# Patient Record
Sex: FEMALE
[De-identification: names, ages, dates, MRNs, and addresses within clinical notes are randomized per-mention and may not be internally consistent; named-entity substitution may affect disease eponyms.]

---

## 2020-07-08 PROBLEM — Z00.00 ENCOUNTER FOR PREVENTIVE HEALTH EXAMINATION: Status: ACTIVE | Noted: 2020-07-08

## 2020-07-20 ENCOUNTER — TRANSCRIPTION ENCOUNTER (OUTPATIENT)
Age: 40
End: 2020-07-20

## 2020-07-20 ENCOUNTER — APPOINTMENT (OUTPATIENT)
Dept: PHYSICAL MEDICINE AND REHAB | Facility: CLINIC | Age: 40
End: 2020-07-20
Payer: COMMERCIAL

## 2020-07-20 VITALS — BODY MASS INDEX: 19.32 KG/M2 | HEIGHT: 62 IN | WEIGHT: 105 LBS | RESPIRATION RATE: 18 BRPM

## 2020-07-20 DIAGNOSIS — Z78.9 OTHER SPECIFIED HEALTH STATUS: ICD-10-CM

## 2020-07-20 DIAGNOSIS — Q79.60 EHLERS-DANLOS SYNDROME, UNSP: ICD-10-CM

## 2020-07-20 DIAGNOSIS — M24.9 JOINT DERANGEMENT, UNSPECIFIED: ICD-10-CM

## 2020-07-20 DIAGNOSIS — M79.10 MYALGIA, UNSPECIFIED SITE: ICD-10-CM
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
## 2020-07-20 DIAGNOSIS — Z56.0 UNEMPLOYMENT, UNSPECIFIED: ICD-10-CM

## 2020-07-20 DIAGNOSIS — M77.11 LATERAL EPICONDYLITIS, RIGHT ELBOW: ICD-10-CM

## 2020-07-20 PROCEDURE — 99204 OFFICE O/P NEW MOD 45 MIN: CPT | Mod: 95

## 2020-07-20 SDOH — ECONOMIC STABILITY - INCOME SECURITY: UNEMPLOYMENT, UNSPECIFIED: Z56.0

## 2020-07-20 NOTE — REVIEW OF SYSTEMS
[Patient Intake Form Reviewed] : Patient intake form was reviewed [Joint Pain] : joint pain [Joint Stiffness] : joint stiffness [Chills] : no chills [Fever] : no fever [Fatigue] : no fatigue [Shortness Of Breath] : no shortness of breath [Cough] : no cough [Wheezing] : no wheezing [Headache] : no headaches [Dizziness] : no dizziness [Difficulty Walking] : no difficulty walking [Negative] : Neurological [FreeTextEntry9] : Right elbow pain

## 2020-07-20 NOTE — ASSESSMENT
[FreeTextEntry1] : 40 F with right elbow pain consistent with repetitive strain injury, and hypermobility consistent with Davide-Danlos syndrome \par \par Recommend\par Referral to medical genetics for further genetic testing to accurately diagnose her EDS, including the typing. Will assist patient finding a EDS specialist to follow her\par Continue PT and increase activity as tolerated\par recommended against running and yoga and encouraged swimming and biking instead\par \par Follow up in 6 weeks\par we discussed life precautions such as refraining from yoga and concentrating on PRE strength building rather than any stretching which is contraindicated

## 2020-07-20 NOTE — PHYSICAL EXAM
[FreeTextEntry1] : Gen: No acute distress, alert and oriented\par HEENT: nontraumatic\par Pulm: no wheezing, breathing comfortably\par Extremities: hypermobility with bilateral pinky extension, shoulder extension, wrist extension, able to touch ground with both palms and reach behind her ankles. Right elbow tender to palpation at lateral epicondyle\par Neuro: Motor exam appears full strength 5/5 [de-identified] : RR 16 no wheeze  [Normal] : Normal skin color and pigmentation, normal turgor and no rash [de-identified] : no wasting no numbness gait NL  [de-identified] : excellent almost ROM spine can hyperextend digits to back of hand  [de-identified] : no non healing wounds  [de-identified] : no deformity no foot drop

## 2020-07-20 NOTE — REASON FOR VISIT
[Home] : at home, [unfilled] , at the time of the visit. [Medical Office: ([facility])___] : at the medical office located in  [Verbal consent obtained from patient] : the patient, [unfilled] [Initial Evaluation] : an initial evaluation

## 2020-07-20 NOTE — HISTORY OF PRESENT ILLNESS
[Home] : at home, [unfilled] , at the time of the visit. [Medical Office: ([facility])___] : at the medical office located in  [FreeTextEntry1] : Pt is a 40 F with newly diagnosed Ehler's Danlo syndrome. Pt believes that she has had this diagnosis for a while, but recently while working with PT, Suzy Goddard, and saw a sports medicine physiatrist from [facility] who diagnosed her. Pt states that over the past two years she has right lateral elbow pain 5/10 currently, but reaches a 10/10 at its worst. She has been unable to work as a  since November of 2018 as she is unable to type and write due to the pain. Pt has been receiving disability. 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 Pt has been looking for an Davide-Danlos specialist. \par she works as a  but has had chronic R UE RSI -rep strain injury  and was not able to work -was on disability for months She is R handed \par she says healing takes a long time \par She is concerned going forward about work issues and inability to continue if the  suspected diagnosis is correct \par Her family are all loose jointed and she has had recurrent sprains- can touch her toes and reach behind her ankles  and is very loose in her joint s\par \par  [Verbal consent obtained from patient] : the patient, [unfilled]